# Patient Record
Sex: MALE | Race: WHITE
[De-identification: names, ages, dates, MRNs, and addresses within clinical notes are randomized per-mention and may not be internally consistent; named-entity substitution may affect disease eponyms.]

---

## 2020-06-14 ENCOUNTER — HOSPITAL ENCOUNTER (OUTPATIENT)
Dept: HOSPITAL 53 - M LABSMTC | Age: 75
End: 2020-06-14
Attending: ANESTHESIOLOGY
Payer: COMMERCIAL

## 2020-06-14 DIAGNOSIS — Z11.59: ICD-10-CM

## 2020-06-14 DIAGNOSIS — Z01.818: Primary | ICD-10-CM

## 2020-06-17 ENCOUNTER — HOSPITAL ENCOUNTER (INPATIENT)
Dept: HOSPITAL 53 - M OR | Age: 75
LOS: 1 days | Discharge: HOME | DRG: 470 | End: 2020-06-18
Attending: ORTHOPAEDIC SURGERY | Admitting: ORTHOPAEDIC SURGERY
Payer: MEDICARE

## 2020-06-17 VITALS — SYSTOLIC BLOOD PRESSURE: 123 MMHG | DIASTOLIC BLOOD PRESSURE: 74 MMHG

## 2020-06-17 VITALS — DIASTOLIC BLOOD PRESSURE: 76 MMHG | SYSTOLIC BLOOD PRESSURE: 138 MMHG

## 2020-06-17 VITALS — SYSTOLIC BLOOD PRESSURE: 114 MMHG | DIASTOLIC BLOOD PRESSURE: 93 MMHG

## 2020-06-17 VITALS — DIASTOLIC BLOOD PRESSURE: 84 MMHG | SYSTOLIC BLOOD PRESSURE: 116 MMHG

## 2020-06-17 VITALS — DIASTOLIC BLOOD PRESSURE: 84 MMHG | SYSTOLIC BLOOD PRESSURE: 139 MMHG

## 2020-06-17 VITALS — WEIGHT: 189 LBS | HEIGHT: 72 IN | BODY MASS INDEX: 25.6 KG/M2

## 2020-06-17 VITALS — DIASTOLIC BLOOD PRESSURE: 74 MMHG | SYSTOLIC BLOOD PRESSURE: 142 MMHG

## 2020-06-17 DIAGNOSIS — D64.9: ICD-10-CM

## 2020-06-17 DIAGNOSIS — I71.4: ICD-10-CM

## 2020-06-17 DIAGNOSIS — Z79.891: ICD-10-CM

## 2020-06-17 DIAGNOSIS — E78.5: ICD-10-CM

## 2020-06-17 DIAGNOSIS — K21.0: ICD-10-CM

## 2020-06-17 DIAGNOSIS — Z88.5: ICD-10-CM

## 2020-06-17 DIAGNOSIS — Z79.899: ICD-10-CM

## 2020-06-17 DIAGNOSIS — Z11.59: ICD-10-CM

## 2020-06-17 DIAGNOSIS — Z90.2: ICD-10-CM

## 2020-06-17 DIAGNOSIS — I71.2: ICD-10-CM

## 2020-06-17 DIAGNOSIS — M85.661: ICD-10-CM

## 2020-06-17 DIAGNOSIS — Z87.891: ICD-10-CM

## 2020-06-17 DIAGNOSIS — M17.11: Primary | ICD-10-CM

## 2020-06-17 DIAGNOSIS — Z87.81: ICD-10-CM

## 2020-06-17 DIAGNOSIS — N40.0: ICD-10-CM

## 2020-06-17 DIAGNOSIS — N52.9: ICD-10-CM

## 2020-06-17 DIAGNOSIS — Z90.49: ICD-10-CM

## 2020-06-17 PROCEDURE — 0QUG07Z SUPPLEMENT RIGHT TIBIA WITH AUTOLOGOUS TISSUE SUBSTITUTE, OPEN APPROACH: ICD-10-PCS | Performed by: ORTHOPAEDIC SURGERY

## 2020-06-17 PROCEDURE — 0SRC0J9 REPLACEMENT OF RIGHT KNEE JOINT WITH SYNTHETIC SUBSTITUTE, CEMENTED, OPEN APPROACH: ICD-10-PCS | Performed by: ORTHOPAEDIC SURGERY

## 2020-06-17 RX ADMIN — ASPIRIN SCH MG: 81 TABLET ORAL at 21:02

## 2020-06-17 RX ADMIN — SODIUM CHLORIDE, POTASSIUM CHLORIDE, SODIUM LACTATE AND CALCIUM CHLORIDE SCH MLS/HR: 600; 310; 30; 20 INJECTION, SOLUTION INTRAVENOUS at 15:00

## 2020-06-17 RX ADMIN — MORPHINE SULFATE PRN MG: 4 INJECTION INTRAVENOUS at 21:04

## 2020-06-17 RX ADMIN — MORPHINE SULFATE PRN MG: 4 INJECTION INTRAVENOUS at 18:37

## 2020-06-17 RX ADMIN — CEFAZOLIN SODIUM SCH MLS/HR: 2 SOLUTION INTRAVENOUS at 21:02

## 2020-06-17 RX ADMIN — SODIUM CHLORIDE, POTASSIUM CHLORIDE, SODIUM LACTATE AND CALCIUM CHLORIDE SCH MLS/HR: 600; 310; 30; 20 INJECTION, SOLUTION INTRAVENOUS at 23:36

## 2020-06-17 NOTE — CR.PDOC
General


Date of Consultation:  2020





Consultation


REASON FOR CONSULTATION/CHIEF COMPLAINT: 


Medical management


Presents to the [facility] for an elective orthopedic procedure





HISTORY OF PRESENT ILLNESS: 


 Patient is a 75-year-old  male with a past medical history of 

malignant adenocarcinoma status post resection of right upper and middle lobe of

lungs, abdominal aortic aneurysm on surveillance, BPH, esophagitis, anemia, who 

presented to [facility] for an elective orthopedic procedure.





Patient follows with Dr. Sosa and has received medical clearance for this 

orthopedic procedure as an outpatient. Hospital services consult for medical 

management.





Patient was seen postoperatively. He denies any headache, nausea, vomiting, 

chest pain, shortness breath, palpitations, cough, abdominal pain, ulceration, 

diarrhea or urinary discomfort. He denies any fevers or chills. Patient reports 

his appetite is fairly normal and denies any changes in his weight.





ALLERGIES: 


Please see below.





HOME MEDICATIONS: 


Please see below.





PAST MEDICAL HISTORY:


Malignant adenocarcinoma status post resection of right upper and middle lobe of

lungs, abdominal aortic aneurysm on surveillance, BPH, esophagitis, anemia





PAST SURGICAL HISTORY: 


Appendectomy


Left femur ORIF


Right tibia fibula ORIF


Cholecystectomy





FAMILY HISTORY:


- Mother and father both  at the age 92. Mother with a history of heart 

disease





SOCIAL HISTORY:


- Patient reports that he quit smoking several years ago. Drinks beer daily. 

Reports occasional marijuana use


- Denies recent travel or sick contacts


- Lives with wife





REVIEW OF SYSTEMS:


10 point review of systems complete, all negative otherwise stated in HPI





PHYSICAL EXAMINATION:


- Vitals: /71, HR 78, RR 18, Sat 95%NC2L, Temp 97.3F


- General: Lying in bed, No acute distress, Speaking in full sentences, AAOx3


- HEENT: NC, AT, PERRLA


- CVS: RRR, +S1S2


- Lungs: Fair air entry bilaterally, No appreciable wheezing / rales / rhonchi 


- Abdomen: Soft, Non-distended, Non-tender


- Extremities: No lower extremity edema, No calf tenderness


- Neuro: No focal motor or sensory deficit


- Skin: No visible rashes  





LABORATORY DATA: 


Please see below.





ASSESSMENT/PLAN: 


Total right knee arthroplasty


- Patient presented to [facility] for an elective orthopedic procedure


- Has received outpatient medical clearance from his primary care provider


- Pain control, anticoagulation and physical therapy at the [facility] orthopedic

surgery





Malignant adenocarcinoma 


- s/p resection of right upper and middle lobe of lungs


Abdominal aortic aneurysm 


- Patient reports that he follows his primary care provider for surveillance





Anemia


- No significant blood loss noted in OR; will follow-up AM lab work





BPH


- Currently does not take any medications





Esophagitis / GERD


- Currently does not take any medications





DVT prophylaxis 


- Anticoagulation as per orthopedic team





Vital Signs/I&O





Vital Signs








  Date Time  Temp Pulse Resp B/P (MAP) Pulse Ox O2 Delivery O2 Flow Rate FiO2


 


20 14:10 97.3 78 18 140/71 (94) 95 Nasal Cannula 2 











Allergies


Coded Allergies:  


     codeine (Verified  Allergy, Intermediate, can't sleep, stimulant, 20)





Home Medications


Scheduled PRN


Acetaminophen (Acetaminophen) 500 Mg Tablet, 500 MG PO PRN PRN for PAIN, 

(Reported)


Ibuprofen (Ibuprofen) 200 Mg Capsule, 200 MG PO PRN PRN for PAIN, (Reported)


Tramadol HCl (Tramadol HCl) 50 Mg Tablet, 50 MG PO PRN PRN for PAIN, (Reported)


   up to 6 doses per day 











MICK GALE MD                2020 14:34

## 2020-06-18 VITALS — DIASTOLIC BLOOD PRESSURE: 71 MMHG | SYSTOLIC BLOOD PRESSURE: 123 MMHG

## 2020-06-18 VITALS — SYSTOLIC BLOOD PRESSURE: 132 MMHG | DIASTOLIC BLOOD PRESSURE: 93 MMHG

## 2020-06-18 LAB
ALBUMIN SERPL BCG-MCNC: 3.5 GM/DL (ref 3.2–5.2)
ALT SERPL W P-5'-P-CCNC: 18 U/L (ref 12–78)
BASOPHILS # BLD AUTO: 0 10^3/UL (ref 0–0.2)
BASOPHILS NFR BLD AUTO: 0.2 % (ref 0–1)
BILIRUB SERPL-MCNC: 1 MG/DL (ref 0.2–1)
BUN SERPL-MCNC: 12 MG/DL (ref 7–18)
CALCIUM SERPL-MCNC: 8.7 MG/DL (ref 8.8–10.2)
CHLORIDE SERPL-SCNC: 105 MEQ/L (ref 98–107)
CO2 SERPL-SCNC: 26 MEQ/L (ref 21–32)
CREAT SERPL-MCNC: 0.79 MG/DL (ref 0.7–1.3)
EOSINOPHIL # BLD AUTO: 0 10^3/UL (ref 0–0.5)
EOSINOPHIL NFR BLD AUTO: 0 % (ref 0–3)
GFR SERPL CREATININE-BSD FRML MDRD: > 60 ML/MIN/{1.73_M2} (ref 42–?)
GLUCOSE SERPL-MCNC: 112 MG/DL (ref 70–100)
HCT VFR BLD AUTO: 35.3 % (ref 42–52)
HGB BLD-MCNC: 11.8 G/DL (ref 13.5–17.5)
LYMPHOCYTES # BLD AUTO: 1 10^3/UL (ref 1.5–5)
LYMPHOCYTES NFR BLD AUTO: 6.4 % (ref 24–44)
MAGNESIUM SERPL-MCNC: 1.9 MG/DL (ref 1.8–2.4)
MCH RBC QN AUTO: 29 PG (ref 27–33)
MCHC RBC AUTO-ENTMCNC: 33.4 G/DL (ref 32–36.5)
MCV RBC AUTO: 86.7 FL (ref 80–96)
MONOCYTES # BLD AUTO: 1.4 10^3/UL (ref 0–0.8)
MONOCYTES NFR BLD AUTO: 8.8 % (ref 0–5)
NEUTROPHILS # BLD AUTO: 13.7 10^3/UL (ref 1.5–8.5)
NEUTROPHILS NFR BLD AUTO: 83.8 % (ref 36–66)
PLATELET # BLD AUTO: 243 10^3/UL (ref 150–450)
POTASSIUM SERPL-SCNC: 4.1 MEQ/L (ref 3.5–5.1)
PROT SERPL-MCNC: 6.4 GM/DL (ref 6.4–8.2)
RBC # BLD AUTO: 4.07 10^6/UL (ref 4.3–6.1)
SODIUM SERPL-SCNC: 137 MEQ/L (ref 136–145)
WBC # BLD AUTO: 16.3 10^3/UL (ref 4–10)

## 2020-06-18 RX ADMIN — CEFAZOLIN SODIUM SCH MLS/HR: 2 SOLUTION INTRAVENOUS at 12:00

## 2020-06-18 RX ADMIN — MORPHINE SULFATE PRN MG: 4 INJECTION INTRAVENOUS at 04:06

## 2020-06-18 RX ADMIN — MORPHINE SULFATE PRN MG: 4 INJECTION INTRAVENOUS at 01:14

## 2020-06-18 RX ADMIN — ASPIRIN SCH MG: 81 TABLET ORAL at 08:13

## 2020-06-18 RX ADMIN — CEFAZOLIN SODIUM SCH MLS/HR: 2 SOLUTION INTRAVENOUS at 04:06

## 2020-06-18 NOTE — RO
DATE OF PROCEDURE:  06/17/2020

 

PREPROCEDURE DIAGNOSIS:  Severe tricompartmental degenerative arthritis of the

right knee.

 

POSTPROCEDURE DIAGNOSIS:  Severe tricompartmental degenerative arthritis of the

right knee.

 

PROCEDURE:

1.  Right total knee arthroplasty with a size 7 cruciate sacrificing femoral

component, size 8 tibial tray and a 12 mm rotating platform polyethylene

stabilized insert.  Prosthesis made by [employer].  It was a PFC

knee.  It was an Attune knee.

2.  Bone graft of the posterior lateral femoral condyle secondary to a large

degenerative cyst.

 

SURGEON:  Dr. NANO Rodriguez

 

ASSISTANT:  Ms. Edith Carney

 

ANESTHESIA:  Spinal with right femoral nerve block.

 

COMPLICATIONS:  None.

 

SPECIMEN:  Joint surface.

 

FINDINGS:  He had severe degenerative change with significant dishing of both the

medial and lateral tibial condyles and hemorrhages were noted throughout the

articular cartilage of the femur and the tibia.  There was a large central cyst

in the proximal tibia and a large cyst in the posterior lateral femoral condyle.

 

DESCRIPTION OF PROCEDURE:  Antibiotics were given intravenously preoperatively

and a successful right femoral nerve block and spinal anesthetic was induced.

Tourniquet was placed on the right upper thigh and not inflated.  The right lower

extremity was carefully prepped and draped in the usual sterile fashion.  Then

the leg was elevated and after appropriate time out the tourniquet was inflated.

A longitudinal incision was made for a medial parapatellar approach to the knee.

Medial parapatellar arthrotomy was performed.  Bovie cautery was used to

coagulate crossing vessels.  He had very large osteophytes on the superior pole

and the periphery of the patella which were debrided.  Subperiosteal dissection

around the proximal medial and lateral tibial plateaus was performed.  We then

everted the patella and flexed the knee and debrided the remnant of the anterior

cruciate ligament (ACL).  The drill placed down the center of the femoral canal,

followed by the intramedullary radha and the distal femoral cutting jig set at 9 mm

resection level, 5 degrees valgus for a right knee.  The distal femoral cut was

performed.  AP sizing jig measured for a size 7.  The 3 degrees external rotation

was dialed in and the pins placed.  The 4-in-1 block applied.  Anterior,

posterior, and chamfer cuts performed.  At this point, we elected to go a

posterior cruciate ligament (PCL) sacrificing knee given his severe deformity and

the lack of any remnant of a posterior cruciate ligament.  Thus, the jig for the

PCL notch osteotomy was applied to the bone and pinned into position.  Then, the

notch osteotomy performed.  We then exposed the proximal tibia and used the

extramedullary alignment guide to estimate being parallel to the mechanical axis

of the tibia, alternating between the medial and lateral side and we ended up

taking equally 4 mm because the knee was relatively balanced in that plane.  Once

the jig was set, secondary check with the extramedullary radha confirmed we

appeared to be parallel to the mechanical axis.  Thus, a proximal tibial

osteotomy was performed.  There was a large central cyst noted.

 

At this point, we placed the lamina  medially and performed a completion

lateral meniscectomy and debridement of posterior lateral osteophytes.  Then, we

placed the lamina  laterally and performed a completion medial

meniscectomy and debridement of the posterior medial osteophytes.  Spacer blocks

were applied.  The 10 mm did fit.  He had a little bit of excess tightness

laterally and some play medially.  Thus, I did end up doing some pie crusting to

the lateral collateral ligament and debridement of the popliteus tendon.  This

did help improve that lateral laxity and eventually we ended up going to a 12,

because it had better stability overall.  He was fairly symmetric otherwise in

the flexion gap and the extension gap.

 

We then exposed the proximal tibia and sized for an 8 tray, which was pinned into

position, followed by the reamer and broach.  Then, we applied the trial femoral

component and then the 10 mm polyethylene was placed and we brought the knee into

extension, everted the patella, performed patellar osteotomy, sized for a 38

button, the lug holes drilled, trial was placed and the patellofemoral tracking

was anatomic.

 

We then did notice still that we had a bit of excess lateral tightening and there

was some valgus play, so I preferred to go up to a 12 insert.  We did remove the

10 insert as well as the trial components and pie crusted the lateral collateral

ligament again to try to stretch the lateral side and we placed the 12.  He still

had good extension even with the 12 and had better stability to varus and valgus

stress testing, thus I felt this was the best component to use.

 

We then drilled the lug holes for the femur and removed all the trial components.

We copiously pulsatile lavage irrigated out the knee joint and placed Exparel in

the subperiosteal tissues around the distal femur and proximal tibia as my

assistant, Ms. Edith Carney, mixed the cement on the back table.  At this point,

I did notice that there was a large cyst noted in the posterolateral condyle.  It

was curetted out down to bare bone.  Then, I used some of the chamfer cut bone

cuts as bone graft to fill this area and smoothed it off with a rongeur.  It was

fairly large cyst, about probably 2-3 cm in diameter, and the bulk of bone graft

filled the defect nicely.

 

Once all the bony surfaces were copiously pulsatile lavage irrigated and

thoroughly dried, we cemented the tibial tray, removed excess cement, and then

cemented  the femoral component, removed excess cement, placed the polyethylene,

and brought the knee into extension, everted the patella and cemented the

patellar button, removed excess cement, and then held it with a clamp with the

knee in extension until the cemented hardened.  Ms. Edith Carney was critical to

the success of this very difficult surgery by helping with appropriate soft

tissue retraction, helping to manipulate the knee, helping to mix the cement,

helping to prepare the patient, helping to close the wound, amongst many other

tasks to allow me to perform the operation smoothly, efficiently and safely.

 

As we were awaiting for the cement to harden, we copiously irrigated out the knee

joint and then placed tranexamic acid into the knee joint and then closed the

apex of the arthrotomy with two #1 PDS sutures, one over the medial parapatellar

area, and then we used the double armed #1 running Stratafix to close the

capsule.  Then the tourniquet was released.  We irrigated between layers and

closed the deep subdermal tissues with interrupted #2-0 PDS sutures and the skin

was closed with staples and covered with an Optifoam and then a dry sterile bulky

dressing.  He was then transferred to the recovery room in stable condition.

There were no intraoperative complications.

## 2020-06-18 NOTE — IPNPDOC
Text Note


Date of Service


The patient was seen on 6/18/20.





NOTE


Subjective: Complains of severe back pain overnight so he could not sleep, his 

pain at the knee at the surgical site is controlled. No nausea or vomiting , no 

chest pain or sob.





PHYSICAL EXAMINATION:


- Vitals: As below


- General: Sitting up in chair,  No acute distress, Speaking in full sentences, 

AAOx3


- HEENT: NC, AT, PERRLA


- CVS: RRR, +S1S2, no rub or murmur or gallop


- Lungs: Fair air entry bilaterally, No appreciable wheezing / rales / rhonchi 


- Abdomen: Soft, Non-distended, Non-tender


- Extremities: No lower extremity edema, No calf tenderness


- Neuro: No focal motor or sensory deficit


- Skin: No visible rashes  





LABORATORY DATA: Reviewed





ASSESSMENT/PLAN: Patient is a 75-year-old  male with a past medical 

history of malignant adenocarcinoma status post resection of right upper and 

middle lobe of lungs, abdominal aortic aneurysm on surveillance, BPH, 

esophagitis, anemia, who presented to [facility] for an elective orthopedic 

procedure.He underwent left total knee replacement on 6/17/20.





Right total knee arthroplasty


Pain control, anticoagulation and physical therapy at the [facility] orthopedic 

surgery





Malignant adenocarcinoma 


s/p resection of right upper and middle lobe of lungs


now reports is cancer free. 





Abdominal aortic aneurysm 


Patient reports that he follows his primary care provider for surveillance





Anemia


No significant blood loss noted in OR; will follow-up AM lab work





BPH


Currently does not take any medications





Esophagitis / GERD


Currently does not take any medications





DVT prophylaxis 


Anticoagulation as per orthopedic team





VS,Bonnie, I+O


VS, Bonnie, I+O


Laboratory Tests


6/18/20 06:41











Vital Signs








  Date Time  Temp Pulse Resp B/P (MAP) Pulse Ox O2 Delivery O2 Flow Rate FiO2


 


6/18/20 12:16   16     


 


6/18/20 06:09      Room Air  


 


6/18/20 06:00 98.2 91  132/93 (106) 96   


 


6/17/20 14:10       2 














I&O- Last 24 Hours up to 6 AM 


 


 6/18/20





 06:00


 


Intake Total 2160 ml


 


Output Total 3295 ml


 


Balance -1135 ml

















ASA COBB MD                   Jun 18, 2020 12:33

## 2020-06-26 ENCOUNTER — HOSPITAL ENCOUNTER (OUTPATIENT)
Dept: HOSPITAL 53 - M RAD | Age: 75
End: 2020-06-26
Attending: PHYSICIAN ASSISTANT
Payer: MEDICARE

## 2020-06-26 DIAGNOSIS — Z79.82: ICD-10-CM

## 2020-06-26 DIAGNOSIS — Z47.1: Primary | ICD-10-CM

## 2020-06-26 DIAGNOSIS — Z79.899: ICD-10-CM

## 2020-06-26 LAB
ERYTHROCYTE [SEDIMENTATION RATE] IN BLOOD BY WESTERGREN METHOD: 65 MM/HR (ref 0–20)
HCT VFR BLD AUTO: 30.2 % (ref 42–52)
HGB BLD-MCNC: 9.9 G/DL (ref 13.5–17.5)
MCH RBC QN AUTO: 28.6 PG (ref 27–33)
MCHC RBC AUTO-ENTMCNC: 32.8 G/DL (ref 32–36.5)
MCV RBC AUTO: 87.3 FL (ref 80–96)
PLATELET # BLD AUTO: 385 10^3/UL (ref 150–450)
RBC # BLD AUTO: 3.46 10^6/UL (ref 4.3–6.1)
WBC # BLD AUTO: 11.6 10^3/UL (ref 4–10)

## 2020-06-26 NOTE — REP
DEEP VENOUS ULTRASONOGRAPHY RIGHT THIGH, RULE OUT DVT:

 

REASON:  Pain and swelling.

 

TECHNIQUE:  Multiple ultrasonographic images of the deep venous structures of the

right thigh were obtained from the common femoral vein to the popliteal vein

along with Doppler interrogation and color flow Doppler images.

 

FINDINGS:

 

There is no abnormal echogenic material seen within any of the visualized deep

venous structures that would suggest acute thrombosis.  Coaptation is

unremarkable throughout.  Doppler interrogation shows an expected response to

respiratory variability and augmentation.  The color flow images show what

appears to be a normal vascular pattern throughout.

 

IMPRESSION:

 

There is no ultrasonographic evidence of deep venous thrombosis involving any of

the visualized deep venous structures of the right thigh, as described above.

 

 

Electronically Signed by

Jaison Coe DO 07/06/2020 07:43 A

## 2020-09-03 ENCOUNTER — HOSPITAL ENCOUNTER (OUTPATIENT)
Dept: HOSPITAL 53 - M LAB | Age: 75
End: 2020-09-03
Attending: ORTHOPAEDIC SURGERY
Payer: MEDICARE

## 2020-09-03 DIAGNOSIS — M17.12: ICD-10-CM

## 2020-09-03 DIAGNOSIS — Z01.818: Primary | ICD-10-CM

## 2020-09-03 LAB
ALBUMIN SERPL BCG-MCNC: 3.7 GM/DL (ref 3.2–5.2)
ALT SERPL W P-5'-P-CCNC: 20 U/L (ref 12–78)
BILIRUB SERPL-MCNC: 0.7 MG/DL (ref 0.2–1)
BUN SERPL-MCNC: 21 MG/DL (ref 7–18)
CALCIUM SERPL-MCNC: 8.7 MG/DL (ref 8.8–10.2)
CHLORIDE SERPL-SCNC: 105 MEQ/L (ref 98–107)
CO2 SERPL-SCNC: 27 MEQ/L (ref 21–32)
CREAT SERPL-MCNC: 0.94 MG/DL (ref 0.7–1.3)
ERYTHROCYTE [SEDIMENTATION RATE] IN BLOOD BY WESTERGREN METHOD: 14 MM/HR (ref 0–20)
GFR SERPL CREATININE-BSD FRML MDRD: > 60 ML/MIN/{1.73_M2} (ref 42–?)
GLUCOSE SERPL-MCNC: 93 MG/DL (ref 70–100)
HCT VFR BLD AUTO: 36.6 % (ref 42–52)
HGB BLD-MCNC: 11.9 G/DL (ref 13.5–17.5)
INR PPP: 1
MCH RBC QN AUTO: 28.5 PG (ref 27–33)
MCHC RBC AUTO-ENTMCNC: 32.5 G/DL (ref 32–36.5)
MCV RBC AUTO: 87.8 FL (ref 80–96)
PLATELET # BLD AUTO: 213 10^3/UL (ref 150–450)
POTASSIUM SERPL-SCNC: 4.5 MEQ/L (ref 3.5–5.1)
PROT SERPL-MCNC: 6.8 GM/DL (ref 6.4–8.2)
PROTHROMBIN TIME: 13.4 SECONDS (ref 11.8–14)
RBC # BLD AUTO: 4.17 10^6/UL (ref 4.3–6.1)
SODIUM SERPL-SCNC: 137 MEQ/L (ref 136–145)
WBC # BLD AUTO: 7 10^3/UL (ref 4–10)

## 2020-09-17 NOTE — REP
CHEST X-RAY:  



CLINICAL: Preoperative assessment.



TECHNIQUE: PA and lateral 



COMPARISON: None.



FINDINGS:

Postoperative changes involving the right hemithorax noted. The lung fields 
demonstrate chronic-appearing interstitial changes. No focal consolidation, 
effusion or pneumothorax. Mediastinum and cardiac silhouette are within normal 
limits. Skeletal structures are intact. 



IMPRESSION: 

Post surgical changes involving the right hemithorax. 



No acute cardiopulmonary process appreciated. 

MTDD

## 2020-09-22 NOTE — ECGEPIP
[facility]

                                       

                                       Test Date:    2020

Pat Name:     SHAYNA THOMAS             Department:   

Patient ID:   [id number]                 Room:         -

Gender:       Male                     Technician:   EHSAN

:          1945               Requested By: NANO Orta

Order Number: [id number]     Reading MD:   Sadi Carnes

                                 Measurements

Intervals                              Axis          

Rate:         86                       P:            50

IN:           190                      QRS:          -35

QRSD:         98                       T:            17

QT:           388                                    

QTc:          467                                    

                           Interpretive Statements

SINUS RHYTHM

MARKED LEFT AXIS DEVIATION

ABNORMAL ECG

SEE SCANNED DOWNTIME REPORT

## 2020-09-24 ENCOUNTER — HOSPITAL ENCOUNTER (OUTPATIENT)
Dept: HOSPITAL 53 - M LABSMTC | Age: 75
End: 2020-09-24
Attending: ANESTHESIOLOGY
Payer: MEDICARE

## 2020-09-24 DIAGNOSIS — Z20.828: ICD-10-CM

## 2020-09-24 DIAGNOSIS — Z01.812: Primary | ICD-10-CM

## 2020-09-28 NOTE — HPE
DATE OF ANTICIPATED ADMISSION:   09/29/2020



ATTENDING PHYSICIAN: Dr. You Rodriguez



CHIEF COMPLAINT: Left knee pain.



HISTORY OF PRESENT ILLNESS:  Mr. Rojas is a pleasant 75-year-old male with 
progressively worsening left pain and stiffness. He has failed to improve with 
conservative treatment. He has elected for surgery for his continued symptoms. 
He has pain with weightbearing activity and his activities of daily living. X-
rays of his knee are notable for advanced osteoarthritis of the left knee joint.
He has consented for a left total knee arthroplasty by Dr. You Rodriguez. 
Medical optimization was performed by Dr. Bolivar.



ALLERGIES: No known drug allergies.



CURRENT MEDICATIONS: 

- Tramadol 50 mg,  up to six a day

- Tussionex 5 mL twice a day as needed 



MEDICAL HISTORY:  History of lung cancer, otherwise healthy. 



PAST SURGICAL HISTORY: 

1. Cholecystectomy.

2. Middle and upper right lung lobectomies from cancer.

3. Appendectomy.

4. Tonsillectomy.



SOCIAL HISTORY: This gentleman is retired. Does not smoke. Occasionally drinks 
alcohol. 



FAMILY HISTORY: Noncontributory.



REVIEW OF SYSTEMS: This patient denies chest pain, heart palpitations, cough, 
wheezing, difficulty breathing, and shortness of breath. He denies abdominal 
pain, nausea, vomiting, diarrhea, or constipation. He denies recent upper 
respiratory infection or urinary tract infection symptoms. He does complain of 
persistent pain in his left knee. 



PHYSICAL EXAMINATION: 

GENERAL: He is a well-nourished, well-developed in no acute distress alert male 
patient. He ambulates with a moderate limp favoring the left lower extremity. He
does use a single-leg cane.

VITAL SIGNS: He is 71 inches tall, weighs 188 pounds with a temperature of 96, 
blood pressure 120/82, pulse 56, respirations 13.

NECK: Supple without adenopathy or jugular venous distention. There were no 
carotid bruits appreciated upon auscultation.

LUNGS: Clear to auscultation without rales or wheeze. Breath sounds were 
diminished in the right upper lobe.

HEART: Regular rate and rhythm.

ABDOMEN: Bowel sounds were present.

EXTREMITIES: Examination of the knee revealed intact skin. He had decreased 
range of motion due to pain and stiffness. The limb is neurovascularly intact.



LABORATORY DATA: 

Protime 13.4, INR 1.00.



Glucose 93, BUN 21, creatinine 0.94, sodium 137, potassium 4.5. 



CBC showed a red count of 4.17, hemoglobin 11.9, hematocrit 36.6, otherwise 
within normal limits. with a sedimentation rate of 14. 



EKG showed sinus rhythm at 66 beats per minute.



Chest x-ray showed no acute cardiopulmonary disease processes. Postsurgical 
changes seen involving the right hemithorax.



IMPRESSION: 

Symptomatic osteoarthritis of the left knee joint. 



PLAN: Consented for a left total knee arthroplasty by Dr. You Rodriguez.

MIRTHA

## 2020-09-29 ENCOUNTER — HOSPITAL ENCOUNTER (INPATIENT)
Dept: HOSPITAL 53 - M OR | Age: 75
LOS: 1 days | Discharge: HOME | DRG: 470 | End: 2020-09-30
Attending: ORTHOPAEDIC SURGERY | Admitting: ORTHOPAEDIC SURGERY
Payer: MEDICARE

## 2020-09-29 VITALS — SYSTOLIC BLOOD PRESSURE: 133 MMHG | DIASTOLIC BLOOD PRESSURE: 85 MMHG

## 2020-09-29 VITALS — SYSTOLIC BLOOD PRESSURE: 137 MMHG | DIASTOLIC BLOOD PRESSURE: 93 MMHG

## 2020-09-29 VITALS — DIASTOLIC BLOOD PRESSURE: 83 MMHG | SYSTOLIC BLOOD PRESSURE: 133 MMHG

## 2020-09-29 VITALS — DIASTOLIC BLOOD PRESSURE: 75 MMHG | SYSTOLIC BLOOD PRESSURE: 114 MMHG

## 2020-09-29 VITALS — WEIGHT: 190 LBS | HEIGHT: 72 IN | BODY MASS INDEX: 25.73 KG/M2

## 2020-09-29 VITALS — SYSTOLIC BLOOD PRESSURE: 141 MMHG | DIASTOLIC BLOOD PRESSURE: 89 MMHG

## 2020-09-29 VITALS — DIASTOLIC BLOOD PRESSURE: 84 MMHG | SYSTOLIC BLOOD PRESSURE: 132 MMHG

## 2020-09-29 VITALS — SYSTOLIC BLOOD PRESSURE: 144 MMHG | DIASTOLIC BLOOD PRESSURE: 88 MMHG

## 2020-09-29 DIAGNOSIS — N40.0: ICD-10-CM

## 2020-09-29 DIAGNOSIS — F12.10: ICD-10-CM

## 2020-09-29 DIAGNOSIS — M17.12: Primary | ICD-10-CM

## 2020-09-29 DIAGNOSIS — Z85.118: ICD-10-CM

## 2020-09-29 DIAGNOSIS — I71.2: ICD-10-CM

## 2020-09-29 PROCEDURE — 0SRD0J9 REPLACEMENT OF LEFT KNEE JOINT WITH SYNTHETIC SUBSTITUTE, CEMENTED, OPEN APPROACH: ICD-10-PCS | Performed by: ORTHOPAEDIC SURGERY

## 2020-09-29 RX ADMIN — CEFAZOLIN SODIUM SCH MLS/HR: 2 SOLUTION INTRAVENOUS at 17:47

## 2020-09-29 NOTE — HPEPDOC
[facility] Medical History & Physical


Date of Admission


Sep 29, 2020


Date of Service:  Sep 29, 2020


Attending Physician:  GABRIEL GORDON MD





History and Physical


CHIEF COMPLAINT: s/p elective L TKA





HISTORY OF PRESENT ILLNESS:


75-year-old  M with a past medical history of malignant adenocarcinoma 

status post RUL and RML lobectomies, abdominal aortic aneurysm and thoracic 

aortic aneurysm on surveillance, BPH, esophagitis and chronic anemia, who 

presented to [facility] for an elective L TKA. His PCP is Dr. Tripathi and received 

medical clearance presurgery. His operation was uneventful and he is doing well 

without acute complaints at this time. Internal medicine is being consulted for 

medical management. At this time, he is doing well without any pain complaints. 

He denies any recent fever, chills, chest pain, dyspnea, palpitations or 

abdominal pain.








PAST MEDICAL HISTORY:


malignant adenocarcinoma status post RUL and RML lobectomies


abdominal aortic aneurysm and thoracic aortic aneurysm on surveillance


BPH


esophagitis


chronic anemia





PAST SURGICAL HISTORY: 


Appendectomy


Left femur ORIF


Right tibia fibula ORIF


Cholecystectomy


R TKA


Tonsillectomy





FAMILY HISTORY:


- Mother and father both . Mother had a history of heart disease





SOCIAL HISTORY:


- Remote smoking history


- Drinks beer daily, 1-2.


- Occasionally smokes marijuana 


- Denies recent travel or sick contacts


- Lives at home with his wife





REVIEW OF SYSTEMS:


10 point review of systems complete, all negative otherwise stated in HPI





PHYSICAL EXAMINATION:


Vitals: HDS, afebrile, saturating well on room air


General: Lying in bed, sleeping, awake on voice, no acute distress, AAOx3


HEENT: NCAT, PERRLA, EOMI, MMM


CVS: RRR, +S1S2, no mrg


Lungs: CTAB, no crackles, rhonchi or wheezing


Abdomen: Normoactive bowel sounds, soft, NTND


Extremities: No lower extremity edema, L knee with ace dressing in place, no 

drainage noted, mild swelling, no surrounding erythema. RLE with healed knee 

surgery scar.


Neuro: No focal motor or sensory deficit, clear speech, CN2-12 intact


Skin: No rashes  





LABORATORY DATA: Please see below. No recent labs done.





ASSESSMENT/PLAN: 


Total left knee arthroplasty


- Pain control, anticoagulation and physical therapy at the [facility] orthopedic

surgery





Malignant adenocarcinoma 


- s/p resection of right upper and middle lobe of lungs





Abdominal aortic aneurysm 


-Follows with primary care provider for surveillance





Anemia


- No significant blood loss during surgery


-AM CBC





BPH


- Currently does not take any medications, will monitor for retention





History of esophagitis


- Currently does not take any medications





DVT prophylaxis 


- Anticoagulation as per orthopedic team





Vital Signs





Vital Signs








  Date Time  Temp Pulse Resp B/P (MAP) Pulse Ox O2 Delivery O2 Flow Rate FiO2


 


20 09:45  70 16 135/77 (96) 100 Nasal Cannula 2 


 


20 08:45 98.1       











Home Medications


Scheduled PRN


Tramadol HCl (Tramadol HCl) 50 Mg Tablet, 50 MG PO Q4HP PRN for PAIN


   up to 6 doses per day 





Allergies


Coded Allergies:  


     codeine (Verified  Allergy, Intermediate, can't sleep, stimulant, 20)





A-FIB/CHADSVASC


A-FIB History


Current/History of A-Fib/PAF?:  No


Current PO Anticoag Therapy:  No





Age/Risk Factor Scoring


CHADSVASC:  








CHADSVASC Response (Comments) Value


 


Age Risk Factor Age 65-74 years old 1


 


Gender Risk Factor Male 0


 


Hx of CHF No 0


 


Hx of HTN No 0


 


Hx of Stroke/TIA/or VTE No 0


 


Hx of Diabetes No 0


 


Hx of Vascular Disease No 0


 


Total  1











Treatment


Treatment ordered:  NONE


Reason Anticoagulant not given:  Not indicated/[id number]











GABRIEL GORDON MD   Sep 29, 2020 12:46

## 2020-09-30 VITALS — SYSTOLIC BLOOD PRESSURE: 131 MMHG | DIASTOLIC BLOOD PRESSURE: 79 MMHG

## 2020-09-30 VITALS — SYSTOLIC BLOOD PRESSURE: 133 MMHG | DIASTOLIC BLOOD PRESSURE: 81 MMHG

## 2020-09-30 VITALS — SYSTOLIC BLOOD PRESSURE: 131 MMHG | DIASTOLIC BLOOD PRESSURE: 80 MMHG

## 2020-09-30 LAB
ALBUMIN SERPL BCG-MCNC: 3.1 GM/DL (ref 3.2–5.2)
ALT SERPL W P-5'-P-CCNC: 18 U/L (ref 12–78)
BILIRUB SERPL-MCNC: 0.7 MG/DL (ref 0.2–1)
BUN SERPL-MCNC: 14 MG/DL (ref 7–18)
CALCIUM SERPL-MCNC: 8.1 MG/DL (ref 8.8–10.2)
CHLORIDE SERPL-SCNC: 108 MEQ/L (ref 98–107)
CO2 SERPL-SCNC: 26 MEQ/L (ref 21–32)
CREAT SERPL-MCNC: 0.67 MG/DL (ref 0.7–1.3)
GFR SERPL CREATININE-BSD FRML MDRD: > 60 ML/MIN/{1.73_M2} (ref 42–?)
GLUCOSE SERPL-MCNC: 99 MG/DL (ref 70–100)
HCT VFR BLD AUTO: 33.2 % (ref 42–52)
HGB BLD-MCNC: 10.9 G/DL (ref 13.5–17.5)
MCH RBC QN AUTO: 27.7 PG (ref 27–33)
MCHC RBC AUTO-ENTMCNC: 32.8 G/DL (ref 32–36.5)
MCV RBC AUTO: 84.3 FL (ref 80–96)
PLATELET # BLD AUTO: 227 10^3/UL (ref 150–450)
POTASSIUM SERPL-SCNC: 4 MEQ/L (ref 3.5–5.1)
PROT SERPL-MCNC: 5.8 GM/DL (ref 6.4–8.2)
RBC # BLD AUTO: 3.94 10^6/UL (ref 4.3–6.1)
SODIUM SERPL-SCNC: 139 MEQ/L (ref 136–145)
WBC # BLD AUTO: 13.7 10^3/UL (ref 4–10)

## 2020-09-30 RX ADMIN — CEFAZOLIN SODIUM SCH MLS/HR: 2 SOLUTION INTRAVENOUS at 11:00

## 2020-09-30 RX ADMIN — CEFAZOLIN SODIUM SCH MLS/HR: 2 SOLUTION INTRAVENOUS at 03:22

## 2020-09-30 NOTE — IPNPDOC
Text Note


Date of Service


The patient was seen on 9/30/20.





NOTE


SUBJECTIVE:


-No complaints this morning. Pain is well controlled.


-No acute events overnight





PHYSICAL EXAMINATION:


Vitals: HDS, afebrile, saturating well on room air


General: Lying in bed, sleeping, awake on voice, no acute distress, AAOx3


HEENT: NCAT, PERRLA, EOMI, MMM


CVS: RRR, +S1S2, no mrg


Lungs: CTAB, no crackles, rhonchi or wheezing


Abdomen: Normoactive bowel sounds, soft, NTND


Extremities: No lower extremity edema, L knee with dressing in place with some 

dried blood on dressing, no drainage noted. RLE with healed knee surgery scar.


Neuro: No focal motor or sensory deficit, clear speech, CN2-12 intact


Skin: No rashes  





LABORATORY DATA: Please see below. 


WBC 13.7


hgb 10.9


platelets 227


na 139


K 4


Cr 0.67





ASSESSMENT/PLAN: 


Total left knee arthroplasty


- Pain control, anticoagulation and physical therapy at the [facility] orthopedic

surgery





Leukocytosis:


-Likely reactive postop


-No fevers, hemodynamically stable, received periop antibiotics





Malignant adenocarcinoma 


- s/p resection of right upper and middle lobe of lungs





Abdominal aortic aneurysm 


-Follows with primary care provider for surveillance





Anemia


- No significant blood loss during surgery


- Stable from prior





BPH


- Currently does not take any medications, will monitor for retention





History of esophagitis


- Currently does not take any medications





DVT prophylaxis 


- Anticoagulation as per orthopedic team





Dispo: per ortho team. Otherwise medically stable for discharge when deemed brayan

ropriate by surgery team.





VS,Fishbone, I+O


VS, Fishbone, I+O


Laboratory Tests


9/30/20 05:36











Vital Signs








  Date Time  Temp Pulse Resp B/P (MAP) Pulse Ox O2 Delivery O2 Flow Rate FiO2


 


9/30/20 07:45   18     


 


9/30/20 02:00 97.8 80  133/81 (98) 99 Room Air  


 


9/29/20 12:17       3 














I&O- Last 24 Hours up to 6 AM 


 


 9/30/20





 06:00


 


Intake Total 2800 ml


 


Output Total 1170 ml


 


Balance 1630 ml

















GABRIEL GORDON MD   Sep 30, 2020 07:55

## 2020-10-06 NOTE — DS
DATE OF ADMISSION:  09/29/2020

DATE OF DISCHARGE:  09/30/2020



ATTENDING PHYSICIAN:  NANO Rodriguez MD



ADMITTING DIAGNOSIS:  Left knee degenerative arthritis.



OTHER DIAGNOSIS:  History of lung cancer.



DISCHARGE DIAGNOSIS:  Left knee degenerative arthritis status post left total 
knee arthroplasty.



HISTORY:  Patient is a 75-year-old male with progressively worsening left knee 
pain and stiffness.  He failed to improve with conservative measures.  He 
continued to have symptoms with weightbearing activities and activities of daily
living.  He consented for an elective left total knee arthroplasty with Dr. Rodriguez for his continued symptoms.  



OPERATION PERFORMED:  Left total knee arthroplasty.



HOSPITAL COURSE:  The patient underwent a left total knee arthroplasty under 
spinal anesthesia with femoral nerve block.  Surgery was uneventful and his 
hospital course was without complication.  He was up with physical therapy per 
their protocol weightbearing as tolerated on the left lower extremity.  Patient 
was discharged on oral pain medications and will resume his preoperative 
medications and diet.  He will use his thromboembolic deterrent stockings and 
take his anticoagulant postoperatively to prevent deep venous thrombosis.  He 
will followup in our office in 12-14 days for a wound check and staple removal. 
He is encouraged to contact our office sooner if there is any increase in pain, 
redness, drainage, numbness or tingling in the extremity, fever greater than 101
degrees, or any other concerns.  Please see medical records for additional 
details.

MIRTHA

## 2020-10-07 NOTE — REP
TWO VIEW LEFT KNEE 



HISTORY: Knee replacement. 



TECHNIQUE: AP and lateral views of the left knee are performed. 



FINDINGS: 

Total left knee prosthesis is noted in good position. Osseous structures are 
intact and well aligned. Air and fluid is seen in the joint. Metallic skin 
staples are seen anteriorly.   

MTDD

## 2020-10-07 NOTE — RO
DATE OF OPERATION:  09/29/2020



PREOPERATIVE DIAGNOSIS:  Left knee severe post-traumatic degenerative arthritis.




POSTOPERATIVE DIAGNOSIS:  Left knee severe post-traumatic degenerative 
arthritis.



PROCEDURES:  Left total knee arthroplasty using a size 7 cruciate-sacrificing 
cemented femoral component, with a size 8 tibial tray, a 8-mm rotating platform 
posterior stabilized polyethylene insert, and a 38-mm polyethylene button. All 
components were cemented. Prosthesis was made by [employer]. It 
was an Att[employer] Knee.



SURGEON: Dr. NANO Rodriguez



ASSISTANT: Mr. Darren Lepe.



COMPLICATIONS: None. 



ESTIMATED BLOOD LOSS: 20 mL. 



SPECIMENS: Joint surface. 



PROCEDURE: Antibiotics were given intravenously preoperatively. A successful 
left femoral nerve block and then a spinal anesthetic were induced. The 
tourniquet was placed on the left upper thigh and not inflated. The left lower 
extremity was carefully prepped and draped in the usual sterile fashion. After 
an appropriate time-out, the tourniquet was inflated. 



A longitudinal incision was made for a medial parapatellar approach to the knee.
 Bovie cautery was used to coagulate crossing vessels. Subperiosteal dissection 
around the proximal, medial, and lateral tibial plateaus performed. The patella 
was everted. The knee was flexed. Drill was placed down the center of the 
femoral canal. The distal femoral cutting jig was then applied and pinned to the
distal femur, set at 5 valgus cut, at 9-mm resection level for a left knee. 
Block was pinned into position. Distal femoral cut performed. AP sizing jig 
measured for a size 7 femur, which matched his opposite side.  Then, 3 of 
external rotation were dialed in, pins placed, and the 4-in-1 block applied. The
anteroposterior chamfer cuts were performed. We then debrided the ACL and PCL 
from the notch, exposed the proximal tibia, used extramedullary alignment jig to
estimate being parallel to the mechanical axis referencing. First we tried to 
reference off the lateral tibial condyle, but we had difficulty getting the 
feeler gauge, thus we referenced off the medial tibial surface at 4-mm resection
level. The block was pinned into position. Secondary check with the 
extramedullary radha confirmed that we appeared to be parallel to the mechanical 
axis of the tibia. Thus, we performed the proximal tibial osteotomy.



The lamina  was then placed laterally and we performed a completion 
medial meniscectomy debriding the posteromedial osteophytes. We then placed the 
lamina  medially and performed a lateral meniscectomy debriding the 
posterolateral osteophytes. Spacer blocks were then placed. The 10 was too 
tight. The 8 fit well, both in flexion and extension with good symmetry to varus
and valgus stress testing. The flexion and extension gaps were actually very 
symmetric. 



At this point, we turned our attention back to the distal femur and placed the 
box cut saw guide onto the femur for a size 7 and a box cut was performed. We 
then exposed the proximal tibia and sized for a size #8 tibial tray, which was 
pinned onto position, followed by the reamer and then the broach. We then placed
the trial femoral component and then the trial polyethylene block in medial 
extension. We got very good range of motion after this.  It is noteworthy that 
preoperatively he could just barely reach 90, about probably 85 of flexion, 
and now with gravity, he has clearly gone to about 100. With the patella 
everted however, he could go all the way to 140. 



We then brought the knee to extension, everted the patella, performed a patellar
osteotomy, sized for a 38 button, lug holes drilled, trial placed, and the 
patellofemoral tracking was anatomic. We then drilled the lug holes for the 
femur, removed all of the trial components. We did place multiple small drill 
bits holes in the sclerotic medial tibial condyle to help with bone-cement 
interface. We then copiously pulsatile lavage irrigated and then we placed 
Exparel in the subperiosteal tissues around the distal femur and the proximal 
tibia. Then, my assistant mixed the cement on the back table as I prepared the 
bony surfaces for cementing with a copious amount of pulsatile lavage irrigant 
solution. Mr. Lepe was also critical to the success of this difficult 
procedure by helping with appropriate soft tissue retraction, helping with 
manipulating the knee as needed, helped to mix the cement, helped to close the 
wound, help to prepare the patient for surgery, amongst many other tasks to 
allow me to perform the operation smoothly and efficiently and safely. 



We then cemented the tibial tray and removed excess cement. We then cemented the
femoral component and removed excess cement. We then placed polyethylene and 
brought the knee into extension, cemented the patellar button, removed excess 
cement, held it with a clamp with the knee in full extension as we waited for 
the cement to harden. As we were awaiting this, we copiously irrigated out the 
knee joint once again and then placed tranexamic acid and then began closing the
apex of the arthrotomy with two #1 PDS sutures. The medial parapatellar area was
closed with a #1 PDS suture. Then, we closed the capsule with a running #1 
Stratafix and then released the tourniquet. We irrigated between layers, closed 
the deep subdermal tissues with interrupted 2-0 PDS sutures. The skin was closed
with staples and covered by an Optifoam and a dry sterile bulky dressing. 



He was then transferred to the recovery room in stable condition. There were no 
intraoperative complications. 

 

MIRTHA